# Patient Record
Sex: MALE | Race: ASIAN | Employment: FULL TIME | URBAN - METROPOLITAN AREA
[De-identification: names, ages, dates, MRNs, and addresses within clinical notes are randomized per-mention and may not be internally consistent; named-entity substitution may affect disease eponyms.]

---

## 2023-09-20 ENCOUNTER — OFFICE VISIT (OUTPATIENT)
Age: 23
End: 2023-09-20

## 2023-09-20 VITALS
BODY MASS INDEX: 26.66 KG/M2 | DIASTOLIC BLOOD PRESSURE: 70 MMHG | WEIGHT: 180 LBS | TEMPERATURE: 98.7 F | HEART RATE: 78 BPM | SYSTOLIC BLOOD PRESSURE: 116 MMHG | RESPIRATION RATE: 22 BRPM | HEIGHT: 69 IN | OXYGEN SATURATION: 99 %

## 2023-09-20 DIAGNOSIS — S60.032A CONTUSION OF LEFT MIDDLE FINGER WITHOUT DAMAGE TO NAIL, INITIAL ENCOUNTER: Primary | ICD-10-CM

## 2023-09-20 NOTE — PROGRESS NOTES
Rajni Espinoza ( 2000) is a 20 y.o. male, New Patient patient, here for evaluation of the following chief complaint(s): Other (Pt was boxing 2 weeks ago , pt encountered injury to his left hand , middle finger )       Information provided by, or accompanied by:   Patient. ASSESSMENT/PLAN:  Imtiaz Guaman was seen today for other. Diagnoses and all orders for this visit:    Contusion of left middle finger without damage to nail, initial encounter  -     XR HAND LEFT (MIN 3 VIEWS); Future       X-ray - no fractures. Sent for over read to radiology. RICE hand out, ice down hand 20 mins three times daily, do not wear splint. Return in about 2 weeks (around 10/4/2023), or if symptoms worsen or fail to improve, for Contusion left hand. .       History provided by:  Patient   used: No    Hand Injury   The incident occurred more than 1 week ago. The incident occurred at the gym. The injury mechanism was a direct blow. The pain is present in the left hand. The pain is moderate. Review of Systems   Musculoskeletal:  Positive for arthralgias. All other systems reviewed and are negative. Subjective:   Pt is a 20 y.o. male     No past medical history on file. No past surgical history on file. No results found for this visit on 09/20/23. Objective:     Physical Exam  Vitals and nursing note reviewed. Constitutional:       General: He is not in acute distress. Appearance: Normal appearance. He is normal weight. He is not ill-appearing, toxic-appearing or diaphoretic. HENT:      Head: Normocephalic and atraumatic. Eyes:      Extraocular Movements: Extraocular movements intact. Conjunctiva/sclera: Conjunctivae normal.   Musculoskeletal:         General: Swelling and tenderness present. Cervical back: Normal range of motion and neck supple. Comments:  Left hand mild edema at 3d MCP joint, DNVI, FROM.    Neurological:      General: No focal deficit

## 2025-02-15 ENCOUNTER — OFFICE VISIT (OUTPATIENT)
Age: 25
End: 2025-02-15

## 2025-02-15 VITALS
DIASTOLIC BLOOD PRESSURE: 49 MMHG | WEIGHT: 185 LBS | HEART RATE: 71 BPM | OXYGEN SATURATION: 98 % | SYSTOLIC BLOOD PRESSURE: 112 MMHG | BODY MASS INDEX: 27.32 KG/M2 | TEMPERATURE: 98.3 F

## 2025-02-15 DIAGNOSIS — M79.671 RIGHT FOOT PAIN: ICD-10-CM

## 2025-02-15 DIAGNOSIS — S99.921A RIGHT FOOT INJURY, INITIAL ENCOUNTER: Primary | ICD-10-CM

## 2025-02-15 ASSESSMENT — ENCOUNTER SYMPTOMS
GASTROINTESTINAL NEGATIVE: 1
EYES NEGATIVE: 1
ALLERGIC/IMMUNOLOGIC NEGATIVE: 1
RESPIRATORY NEGATIVE: 1

## 2025-02-15 NOTE — PATIENT INSTRUCTIONS
[facility].    Treatment for foot pain is easy to remember if you think of the word \"RICE\":  REST - To rest the foot, you can use crutches and stay off your feet  ICE - Apply a cold gel pack, bag of ice, or bag of frozen vegetables on your foot every 1 to 2 hours, for 15 minutes each time.  Put a thin towel between the ice (or other cold object) and your skin.  Use the ice (or other cold object) for at least 6 hours after your injury.  Some people find it helpful to ice longer, even up to 2 days after their injury.  COMPRESSION - Compression basically means pressure.  You want to have your foot under slight pressure by having it wrapped in an elastic \"compression\" bandage.  This helps reduce swelling and supports the foot.  Your doctor or nurse will show you how to wrap your foot.  It's important that you do not use too much pressure and cut off the blood flow to your foot.  ELEVATION - \"Elevation\" means you should keep your foot raised up above the level of your heart.  To do this, you can put your foot on some pillows or blankets while you are laying down, or on a table or chair while you are sitting.    -You can also take medicines to relieve pain, such as acetaminophen (Tylenol), ibuprofen (Advil, Motrin) or naproxen (Aleve)    Follow up with orthopaedist or the emergency room if symptoms worsen or persist.

## 2025-02-15 NOTE — PROGRESS NOTES
2/15/2025   Marycarmen Loera (: 2000) is a 24 y.o. male, New patient, here for evaluation of the following chief complaint(s):  Foot Pain (Pain on top of right foot injury a couple months ago still having some pain)     ASSESSMENT/PLAN:  Below is the assessment and plan developed based on review of pertinent history, physical exam, labs, studies, and medications.       Assessment & Plan  Right foot injury, initial encounter  Status post injury mid November   Discussed xray results with patient   IMPRESSION:No acute bony abnormality.    Patient alert and oriented, vital signs stable.    No evidence for fracture noted on x-ray.  Discussed with patient the need for further diagnostic testing should pain not improve in 5-7 days.  Patient in agreement.  Patient refused Ace wrap.  Instructed to rest, elevate and use ice compresses as discussed.  May take Ibuprofen or Tylenol as directed for pain.  May ambulate as tolerated. Educational information provided to patient.  Follow up with PCP or orthopaedics if not better in 5-7 days.   Right foot pain  OTC medication for comfort and follow up with podiatrist   Orders:    XR FOOT RIGHT (MIN 3 VIEWS); Future    [facility] - Tera Garcia, [facility], Podiatry, [place]      Handout given with care instructions  OTC for symptom management. Increase fluid intake, ensure adequate nutritional intake.  Follow up with PCP as needed.  Go to ED with development of any acute symptoms.     Follow up:  No follow-ups on file.  Follow up immediately for any new, worsening or changes or if symptoms are not improving over the next 5-7 days.     SUBJECTIVE/OBJECTIVE:  C/o right foot pain rated 7/10 on the pain scale with walking that started after having an injury mid November. He reports that he was playing in a mens basketball game and a heavy person fell on his foot.       History provided by:  Patient   used: No    Foot Pain            Foot Pain (Pain on top of right foot injury

## 2025-04-11 ENCOUNTER — OFFICE VISIT (OUTPATIENT)
Age: 25
End: 2025-04-11

## 2025-04-11 VITALS
OXYGEN SATURATION: 97 % | RESPIRATION RATE: 16 BRPM | DIASTOLIC BLOOD PRESSURE: 64 MMHG | WEIGHT: 188 LBS | TEMPERATURE: 98.2 F | HEIGHT: 69 IN | SYSTOLIC BLOOD PRESSURE: 105 MMHG | HEART RATE: 60 BPM | BODY MASS INDEX: 27.85 KG/M2

## 2025-04-11 DIAGNOSIS — S61.011A: Primary | ICD-10-CM

## 2025-04-11 ASSESSMENT — ENCOUNTER SYMPTOMS
ALLERGIC/IMMUNOLOGIC NEGATIVE: 1
GASTROINTESTINAL NEGATIVE: 1
EYES NEGATIVE: 1
RESPIRATORY NEGATIVE: 1

## 2025-04-11 NOTE — PROGRESS NOTES
interpretive errors are inadvertently transcribed by the computer software. Efforts were made to edit the dictation but occasionally words remain mis-transcribed.)     An electronic signature was used to authenticate this note.  -- BARBARA Diallo - NP

## 2025-04-11 NOTE — PATIENT INSTRUCTIONS
Wound cleansed, topical antibiotic applied and bandaged  Tetanus was offered and you declined   Instructions provided on wound care at home  Follow up with PCP as needed  Discussed conditions which warrant ER visit

## 2025-09-06 ENCOUNTER — HOSPITAL ENCOUNTER (EMERGENCY)
Facility: HOSPITAL | Age: 25
Discharge: HOME | End: 2025-09-06
Attending: STUDENT IN AN ORGANIZED HEALTH CARE EDUCATION/TRAINING PROGRAM
Payer: COMMERCIAL

## 2025-09-06 ENCOUNTER — APPOINTMENT (OUTPATIENT)
Facility: HOSPITAL | Age: 25
End: 2025-09-06
Payer: COMMERCIAL

## 2025-09-06 VITALS
HEIGHT: 69 IN | OXYGEN SATURATION: 100 % | HEART RATE: 60 BPM | TEMPERATURE: 97.5 F | WEIGHT: 170 LBS | DIASTOLIC BLOOD PRESSURE: 86 MMHG | BODY MASS INDEX: 25.18 KG/M2 | SYSTOLIC BLOOD PRESSURE: 138 MMHG | RESPIRATION RATE: 16 BRPM

## 2025-09-06 DIAGNOSIS — T07.XXXA ABRASIONS OF MULTIPLE SITES: ICD-10-CM
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
## 2025-09-06 DIAGNOSIS — M79.642 LEFT HAND PAIN: ICD-10-CM

## 2025-09-06 PROCEDURE — 73130 X-RAY EXAM OF HAND: CPT

## 2025-09-06 RX ORDER — GINSENG 100 MG
CAPSULE ORAL
Status: DISCONTINUED | OUTPATIENT
Start: 2025-09-06 | End: 2025-09-06 | Stop reason: HOSPADM

## 2025-09-06 ASSESSMENT — ENCOUNTER SYMPTOMS
VOMITING: 0
NAUSEA: 0
COUGH: 0
ABDOMINAL PAIN: 0
SHORTNESS OF BREATH: 0
DIARRHEA: 0
SORE THROAT: 0
EYE PAIN: 0

## 2025-09-06 ASSESSMENT — LIFESTYLE VARIABLES
HOW MANY STANDARD DRINKS CONTAINING ALCOHOL DO YOU HAVE ON A TYPICAL DAY: PATIENT DOES NOT DRINK
HOW OFTEN DO YOU HAVE A DRINK CONTAINING ALCOHOL: NEVER

## 2025-09-06 ASSESSMENT — PAIN SCALES - GENERAL: PAINLEVEL_OUTOF10: 0
